# Patient Record
(demographics unavailable — no encounter records)

---

## 2025-01-15 NOTE — HISTORY OF PRESENT ILLNESS
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 Patient was wresting last Friday when his opponents head hit into the back of his head.  Patient was having a mild headache for the last 2 hours respiratory incident.  He has not had any symptoms since that time.  Denies light sensitivity memory loss, dizziness, blurry vision.  Is able to attend school yesterday and today without issues.  No concerns with his behavior tomorrow on behalf of his parent.

## 2025-01-15 NOTE — DISCUSSION/SUMMARY
[de-identified] : Patient is seen today for evaluation management of recent head injury.  He is diagnosed with head contusion.  Based on history and clinical exam it does not appear that the patient has sustained a concussion.  No need for formal return to play protocol as he did not sustain a concussion.  I advised the patient and his parent that I would recommend he return to practice, as long as he has no symptoms after 1 full practice he is cleared for full activity thereafter.  Patient and parent appreciate and agree with current plan.  This note was generated using dragon medical dictation software.  A reasonable effort has been made for proofreading its contents, but typos may still remain.  If there are any questions or points of clarification needed please notify my office.

## 2025-01-15 NOTE — RETURN TO WORK/SCHOOL
[FreeTextEntry1] : Abimael was seen today for evaluation of a recent head injury.  He is diagnosed with head contusion without concussion.  No need for formal return to play protocol since there was no concussion.  He is permitted to return to practice on Thursday, 1/16/2025 and he is cleared for full activity thereafter. Should you have any questions please call the office at [phone] Thank you for your understanding.     Fidel De León DO, ATC Primary Care Sports Medicine [facility] [place]

## 2025-01-15 NOTE — RETURN TO WORK/SCHOOL
[FreeTextEntry1] : Abimael was seen today for evaluation of a recent head injury.  He is diagnosed with head contusion without concussion.  No need for formal return to play protocol since there was no concussion.  He is permitted to return to practice on Thursday, 1/16/2025 and he is cleared for full activity thereafter. Should you have any questions please call the office at [phone] Thank you for your understanding.     Fidel De León DO, ATC Primary Care Sports Medicine [facility] [place]

## 2025-01-15 NOTE — DISCUSSION/SUMMARY
[de-identified] : Patient is seen today for evaluation management of recent head injury.  He is diagnosed with head contusion.  Based on history and clinical exam it does not appear that the patient has sustained a concussion.  No need for formal return to play protocol as he did not sustain a concussion.  I advised the patient and his parent that I would recommend he return to practice, as long as he has no symptoms after 1 full practice he is cleared for full activity thereafter.  Patient and parent appreciate and agree with current plan.  This note was generated using dragon medical dictation software.  A reasonable effort has been made for proofreading its contents, but typos may still remain.  If there are any questions or points of clarification needed please notify my office.

## 2025-01-15 NOTE — PHYSICAL EXAM
[de-identified] : Constitutional: Well-nourished, well-developed, No acute distress Respiratory:  Good respiratory effort, no SOB Psychiatric: Pleasant and normal affect, alert and oriented x3 Musculoskeletal: normal except where as noted in regional exam  Cervical Spine Exam Head:  Normocephalic, atraumatic, EOMI, PERRLA APPEARANCE: no marked deformities or malalignment, normal curvature, good posture POSITIVE TENDERNESS: none NONTENDER: no bony midline tenderness, no marked tenderness in paracervicals or upper trapezius, no marked spasm. ROM: full & painless in all planes RESISTIVE TESTING: painless 5/5 resisted flex/ext, sidebending b/l, and shoulder shrug  Neuro: C5 - T1 intact to motor  Neuro:  Neg Romberg, Neg balance error testing   Vestibular-occular testing:   Horizontal Nystagmus:  Negative Vertical Nystagmus:  Negative Smooth Pursuit:  NL Accommodation/Convergence:  NL Thumb held out in front of face, head turn with eyes focused: NL Hands held out in front with thumbs/hands locked together, trunk rotation with head fixed: NL Walking while looking over shoulders side-to-side repeatedly: NL with no drift Walking while looking up and down repeatedly: NL with no drift

## 2025-01-15 NOTE — HISTORY OF PRESENT ILLNESS
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 Patient was wresting last Friday when his opponents head hit into the back of his head.  Patient was having a mild headache for the last 2 hours respiratory incident.  He has not had any symptoms since that time.  Denies light sensitivity memory loss, dizziness, blurry vision.  Is able to attend school yesterday and today without issues.  No concerns with his behavior tomorrow on behalf of his parent.

## 2025-01-15 NOTE — PHYSICAL EXAM
[de-identified] : Constitutional: Well-nourished, well-developed, No acute distress Respiratory:  Good respiratory effort, no SOB Psychiatric: Pleasant and normal affect, alert and oriented x3 Musculoskeletal: normal except where as noted in regional exam  Cervical Spine Exam Head:  Normocephalic, atraumatic, EOMI, PERRLA APPEARANCE: no marked deformities or malalignment, normal curvature, good posture POSITIVE TENDERNESS: none NONTENDER: no bony midline tenderness, no marked tenderness in paracervicals or upper trapezius, no marked spasm. ROM: full & painless in all planes RESISTIVE TESTING: painless 5/5 resisted flex/ext, sidebending b/l, and shoulder shrug  Neuro: C5 - T1 intact to motor  Neuro:  Neg Romberg, Neg balance error testing   Vestibular-occular testing:   Horizontal Nystagmus:  Negative Vertical Nystagmus:  Negative Smooth Pursuit:  NL Accommodation/Convergence:  NL Thumb held out in front of face, head turn with eyes focused: NL Hands held out in front with thumbs/hands locked together, trunk rotation with head fixed: NL Walking while looking over shoulders side-to-side repeatedly: NL with no drift Walking while looking up and down repeatedly: NL with no drift